# Patient Record
(demographics unavailable — no encounter records)

---

## 2025-01-06 NOTE — HISTORY OF PRESENT ILLNESS
[Friend] : friend [FreeTextEntry1] : follow-up, administrative encounter [de-identified] : 27 year old female here for a f/u visit. Currently she has no acute medical complaints. She is requesting testing for pre-employment.

## 2025-01-06 NOTE — HISTORY OF PRESENT ILLNESS
[Friend] : friend [FreeTextEntry1] : follow-up, administrative encounter [de-identified] : 27 year old female here for a f/u visit. Currently she has no acute medical complaints. She is requesting testing for pre-employment.

## 2025-01-06 NOTE — REVIEW OF SYSTEMS
[Fever] : no fever [Discharge] : no discharge [Vision Problems] : no vision problems [Earache] : no earache [Nasal Discharge] : no nasal discharge [Chest Pain] : no chest pain [Shortness Of Breath] : no shortness of breath [Abdominal Pain] : no abdominal pain [Vomiting] : no vomiting [Dysuria] : no dysuria [Hematuria] : no hematuria [Joint Pain] : no joint pain [Muscle Pain] : no muscle pain [Itching] : no itching

## 2025-02-10 NOTE — HISTORY OF PRESENT ILLNESS
[FreeTextEntry1] : She is a 27-year-old female with a long history of intermittent crampy upper abdominal pain associate with heartburn and chest pain.  She has had this since high school but has been worse recently.  She denies NSAID use.  She denies nausea vomiting or difficulty swallowing fods.She denies that stress induces her symptoms nor does any particular foods precipitate his symptoms.  She has a past history of peptic ulcer disease and had an endoscopy in 2018 at which time a gastric ulcer was found.   She also has a family history of gastric CA specifically her father.

## 2025-03-17 NOTE — HISTORY OF PRESENT ILLNESS
[FreeTextEntry1] : Dear Rafael,   I had the pleasure of seeing your patient DICK ABERNATHY for Cardiometabolic evaluation.   As you know, she  is a Pleasant, 27 year old with a past medical history of Morbid Obesity, PCOS, Pre-T2DM =============== =============== - Discussed diet and exercise at length - Start GLP1RA - Check Lpa/CRP - We discussed diet/exercise to be followed by nutritional counseling by an appointment to be made with our RD at the lipid center. - Discussed risks/benefits of GLP1RA and strategy for very gradual discontinuation of medication ----------------- -----------------  Mounj 5  Lost 20 lbs  ----------------- ----------------- 3-2025 CC: Heart Issues - Patient reports no chest pain, no localization to the sternum, no radiation to the neck/jaw, no alleviating nor worsening precipitants to CP, no assoc symptoms to CP no alleviating nor worsening precipitants to CP, no assoc symptoms to CP - Patient notes no associated SOB/Palps/Leg swelling - Reports No associated F/C/N/V/Headaches - Reports Normal Exercise Tolerance - Reports no medication changes - Reports normal mood/quality of life - Reports no associated midnight awakenings from cP - Reports no diet changes - Reports no associated body aches - Reports no recent colds/viruses - Recent labs/imaging reviewed - Relevant Family history reviewed Mother/Father - CV Risk Assessment for 10 Year ACC/AHA Pooled Risk Cohort Equation places this person at < 7.5% Risk of ASCVD Incr Mounj to 10 mg        ----------------------------

## 2025-03-17 NOTE — DISCUSSION/SUMMARY
[FreeTextEntry1] : In summary   Madi, 37 year old with a past medical history of Morbid Obesity, Hyperlipidemia, Pre-T2DM, ASCVD By Plaque On CT =============== =============== Morbid Obesity, Hyperlipidemia, Pre-T2DM, ASCVD By Plaque On CT  - Discussed diet and exercise at length - Continue GLP1RA - Check Lpa/CRP - We discussed diet/exercise to be followed by nutritional counseling by an appointment to be made with our RD at the lipid center. - Discussed risks/benefits of GLP1RA and strategy for very gradual discontinuation of medication - [facility] per Dr. Adilson Hall, kind thanks for the referral.   Dorian Gandhi MD [facility] AZRA NI Director, Preventive Cardiology & Lipidology [facility]                                                                                                                                                                                                                                                                     --                                                                                                                                                                                                                                                                                                                                                                                                                                                                                                                                                                                                                                                                                                                                   Obesity management - Assessed - Impression is active - Sleep apnea assessed - Recent labs/imaging reviewed including from PCP Cholesterol management - Assessed - Impression is active; changes as per above - Discussed diet and exercise at length - Any lifestyle/medication changes as per above BP - Assessed - Impression is active Risk factors for cardiomyopathy - Assessed - Impression is stable Cardiac Health optimization - Discussed diet and exercise at length - Discussed importance of monitoring and re-assessment of cardiac health on further visits            -----------

## 2025-03-25 NOTE — PLAN
[FreeTextEntry1] : Pre-operative evaluation - Vitals/Physical exam/EKG/labs at baseline, patient is medically optimized for proposed procedure and may proceed ahead as planned.

## 2025-03-25 NOTE — HISTORY OF PRESENT ILLNESS
[No Pertinent Cardiac History] : no history of aortic stenosis, atrial fibrillation, coronary artery disease, recent myocardial infarction, or implantable device/pacemaker [No Pertinent Pulmonary History] : no history of asthma, COPD, sleep apnea, or smoking [Smoker] : smoker [No Adverse Anesthesia Reaction] : no adverse anesthesia reaction in self or family member [Aortic Stenosis] : no aortic stenosis [Atrial Fibrillation] : no atrial fibrillation [Coronary Artery Disease] : no coronary artery disease [Recent Myocardial Infarction] : no recent myocardial infarction [Implantable Device/Pacemaker] : no implantable device/pacemaker [Asthma] : no asthma [COPD] : no COPD [Sleep Apnea] : no sleep apnea [Family Member] : no family member with adverse anesthesia reaction/sudden death [Self] : no previous adverse anesthesia reaction [Chronic Anticoagulation] : no chronic anticoagulation [Chronic Kidney Disease] : no chronic kidney disease [Diabetes] : no diabetes [FreeTextEntry1] : Liposuction [FreeTextEntry2] : 6/10/2025 [FreeTextEntry3] : Dr. Moran [FreeTextEntry4] : 27 year old female here for pre-operative evaluation. Currently she has no acute medical complaints.  [Friend] : friend

## 2025-04-16 NOTE — HISTORY OF PRESENT ILLNESS
[FreeTextEntry1] : Ms. DICK ABERNATHY  is a 28 year old female who has a past medical history significant for  Morbid Obesity, PCOS, Pre-T2DM who presents via telehealth for a follow up evaluation. Patient feels generally well today. Denies SOB, chest pain, palpitations, dizziness, and syncope.   =============== =============== - Discussed diet and exercise at length - Start GLP1RA - Check Lpa/CRP - We discussed diet/exercise to be followed by nutritional counseling by an appointment to be made with our RD at the lipid center. - Discussed risks/benefits of GLP1RA and strategy for very gradual discontinuation of medication ----------------- -----------------  Mounj 5  Lost 20 lbs  ----------------- ----------------- 3-2025 CC: Heart Issues - Patient reports no chest pain, no localization to the sternum, no radiation to the neck/jaw, no alleviating nor worsening precipitants to CP, no assoc symptoms to CP no alleviating nor worsening precipitants to CP, no assoc symptoms to CP - Patient notes no associated SOB/Palps/Leg swelling - Reports No associated F/C/N/V/Headaches - Reports Normal Exercise Tolerance - Reports no medication changes - Reports normal mood/quality of life - Reports no associated midnight awakenings from cP - Reports no diet changes - Reports no associated body aches - Reports no recent colds/viruses - Recent labs/imaging reviewed - Relevant Family history reviewed Mother/Father - CV Risk Assessment for 10 Year ACC/AHA Pooled Risk Cohort Equation places this person at < 7.5% Risk of ASCVD Incr Mounj to 10 mg    ======================= 4/2025  Pt no show on Audiovisual link called at message left at [phone] to call office to reschedule

## 2025-04-16 NOTE — HISTORY OF PRESENT ILLNESS
[FreeTextEntry1] : Ms. DICK ABERNATHY  is a 28 year old female who has a past medical history significant for  Morbid Obesity, PCOS, Pre-T2DM who presents via telehealth for a follow up evaluation. Patient feels generally well today. Denies SOB, chest pain, palpitations, dizziness, and syncope.   =============== =============== - Discussed diet and exercise at length - Start GLP1RA - Check Lpa/CRP - We discussed diet/exercise to be followed by nutritional counseling by an appointment to be made with our RD at the lipid center. - Discussed risks/benefits of GLP1RA and strategy for very gradual discontinuation of medication ----------------- -----------------  Mounj 5  Lost 20 lbs  ----------------- ----------------- 3-2025 CC: Heart Issues - Patient reports no chest pain, no localization to the sternum, no radiation to the neck/jaw, no alleviating nor worsening precipitants to CP, no assoc symptoms to CP no alleviating nor worsening precipitants to CP, no assoc symptoms to CP - Patient notes no associated SOB/Palps/Leg swelling - Reports No associated F/C/N/V/Headaches - Reports Normal Exercise Tolerance - Reports no medication changes - Reports normal mood/quality of life - Reports no associated midnight awakenings from cP - Reports no diet changes - Reports no associated body aches - Reports no recent colds/viruses - Recent labs/imaging reviewed - Relevant Family history reviewed Mother/Father - CV Risk Assessment for 10 Year ACC/AHA Pooled Risk Cohort Equation places this person at < 7.5% Risk of ASCVD Incr Mounj to 10 mg    ======================= 4/2025  Pt no show on Audiovisual link called at message left at [phone] to call office to reschedule

## 2025-04-16 NOTE — DISCUSSION/SUMMARY
[FreeTextEntry1] : Diagnosis:Morbid Obesity, Hyperlipidemia, Pre-T2DM, ASCVD By Plaque On CT   Pt no show on Audiovisual link called at message left to call office to reschedule            -----------

## 2025-06-09 NOTE — REVIEW OF SYSTEMS
[Fatigue] : fatigue [Abdominal Pain] : abdominal pain [Nausea] : nausea [Dysuria] : dysuria [Headache] : headache [Fever] : no fever [Discharge] : no discharge [Vision Problems] : no vision problems [Earache] : no earache [Chest Pain] : no chest pain [Nasal Discharge] : no nasal discharge [Orthopnea] : no orthopnea [Shortness Of Breath] : no shortness of breath [Joint Pain] : no joint pain [Muscle Pain] : no muscle pain [Itching] : no itching [Skin Rash] : no skin rash [Suicidal] : not suicidal [Memory Loss] : no memory loss [Easy Bleeding] : no easy bleeding

## 2025-06-09 NOTE — REVIEW OF SYSTEMS
[Fatigue] : fatigue [Abdominal Pain] : abdominal pain [Nausea] : nausea [Dysuria] : dysuria [Headache] : headache [Fever] : no fever [Vision Problems] : no vision problems [Discharge] : no discharge [Earache] : no earache [Nasal Discharge] : no nasal discharge [Chest Pain] : no chest pain [Orthopnea] : no orthopnea [Shortness Of Breath] : no shortness of breath [Joint Pain] : no joint pain [Muscle Pain] : no muscle pain [Itching] : no itching [Skin Rash] : no skin rash [Suicidal] : not suicidal [Memory Loss] : no memory loss [Easy Bleeding] : no easy bleeding

## 2025-06-09 NOTE — PLAN
[FreeTextEntry1] : Abdominal pain - acute on chronic, has h/o gallstones, vitals/PE benign, will check US abdomen, has appt pending w/ general surgery next week to evaluate for cholecystectomy. Will check labs. Has been to GI in past and had EGD done.   Obesity - chronic, losing weight on mounjaro 10 PCOS/Pre-DM - chronic, will check labs

## 2025-06-09 NOTE — HISTORY OF PRESENT ILLNESS
[de-identified] : 28 year old female here with complaints of abdominal pain which has been on/off for several years.  Currently the pain is mild, about 2/10.  She does have a history of gallstones and is requesting labs/imaging.  She also complains of nausea at times as well.  She denies any bowel changes such as diarrhea, blood in stool or constipation.   [FreeTextEntry1] : follow-up, abdominal pain

## 2025-06-09 NOTE — HISTORY OF PRESENT ILLNESS
[FreeTextEntry1] : follow-up, abdominal pain [de-identified] : 28 year old female here with complaints of abdominal pain which has been on/off for several years.  Currently the pain is mild, about 2/10.  She does have a history of gallstones and is requesting labs/imaging.  She also complains of nausea at times as well.  She denies any bowel changes such as diarrhea, blood in stool or constipation.

## 2025-06-13 NOTE — CONSULT LETTER
[Dear  ___] : Dear  [unfilled], [Consult Letter:] : I had the pleasure of evaluating your patient, [unfilled]. [Please see my note below.] : Please see my note below. [Consult Closing:] : Thank you very much for allowing me to participate in the care of this patient.  If you have any questions, please do not hesitate to contact me. [Sincerely,] : Sincerely, [FreeTextEntry2] : Dr. Gamaliel De Anda [FreeTextEntry3] : Jason Smith MD, FACS, FASMBS [facility] [address] [place] [address] tel. [phone] fax [phone]

## 2025-06-13 NOTE — ASSESSMENT
[FreeTextEntry1] : 28-year-old female with symptomatic cholelithiasis and cholecystitis.  Currently on antibiotics. I have recommended laparoscopic cholecystectomy.  Risks, benefits, alternatives discussed at length with patient. This included discussion of laparoscopic approach, risk of bleeding, small risk of injury to biliary ductal system, and possibility of conversion to open procedure. The patient expressed understanding and wishes to proceed.

## 2025-06-13 NOTE — PHYSICAL EXAM
[de-identified] : Well-appearing, no acute distress [de-identified] : Sclerae anicteric [de-identified] : Soft, nontender, nondistended.  Negative Shen sign.  No palpable masses.

## 2025-06-13 NOTE — PLAN
[FreeTextEntry1] : Laparoscopic cholecystectomy to be scheduled Complete course of antibiotics Advised to hold Mounjaro for the time being.  She will restart it postoperatively.

## 2025-06-13 NOTE — HISTORY OF PRESENT ILLNESS
[de-identified] : DICK  is a 28 year  female  here for surgical consultation for symptomatic gallstones.  Patient presents with chronic history of right upper quadrant pain radiating to right shoulder and mid back.  She states her pain has been intermittent over the last several years however more recently this year her pain has increased in both frequency and intensity.  She says the pain occurs at least once a week now, usually precipitated by a meal.  She reports no fevers or chills.  Normal bowel movements.  Patient had a series of tests done including CT scan, endoscopy and ultrasound.  CT scan and ultrasound demonstrate cholelithiasis with gallbladder wall thickening and questionable cholecystitis.  Patient is currently on ciprofloxacin.  Past medical history is notable for PCOS, prediabetes, obesity (has lost approximately 100 pounds on Mounjaro; last dose was Monday) Past surgical history: Reconstructive plastic surgery of arms, knee surgery, D&C

## 2025-06-13 NOTE — PHYSICAL EXAM
[de-identified] : Well-appearing, no acute distress [de-identified] : Sclerae anicteric [de-identified] : Soft, nontender, nondistended.  Negative Shen sign.  No palpable masses.

## 2025-06-13 NOTE — HISTORY OF PRESENT ILLNESS
[de-identified] : DICK  is a 28 year  female  here for surgical consultation for symptomatic gallstones.  Patient presents with chronic history of right upper quadrant pain radiating to right shoulder and mid back.  She states her pain has been intermittent over the last several years however more recently this year her pain has increased in both frequency and intensity.  She says the pain occurs at least once a week now, usually precipitated by a meal.  She reports no fevers or chills.  Normal bowel movements.  Patient had a series of tests done including CT scan, endoscopy and ultrasound.  CT scan and ultrasound demonstrate cholelithiasis with gallbladder wall thickening and questionable cholecystitis.  Patient is currently on ciprofloxacin.  Past medical history is notable for PCOS, prediabetes, obesity (has lost approximately 100 pounds on Mounjaro; last dose was Monday) Past surgical history: Reconstructive plastic surgery of arms, knee surgery, D&C

## 2025-06-13 NOTE — CONSULT LETTER
[Dear  ___] : Dear  [unfilled], [Consult Letter:] : I had the pleasure of evaluating your patient, [unfilled]. [Please see my note below.] : Please see my note below. [Consult Closing:] : Thank you very much for allowing me to participate in the care of this patient.  If you have any questions, please do not hesitate to contact me. [Sincerely,] : Sincerely, [FreeTextEntry2] : Dr. Gamaliel De Anda [FreeTextEntry3] : Jason Smith MD, FACS, FASMBS [facility] [address] [place] [address] tel. [phone] fax [phone]

## 2025-07-14 NOTE — PLAN
[FreeTextEntry1] : Advised to follow a low-fat diet over the next several weeks No heavy lifting or abdominal exercises for 2 more weeks Follow-up as needed

## 2025-07-14 NOTE — HISTORY OF PRESENT ILLNESS
[de-identified] : DICK is a 28 year old female, s/p Robot-assisted laparoscopic cholecystectomy, ICG cholangiogram, and bilateral TAP block 06/25/25.  Overall she says she feels well.  Patient is tolerating a normal diet without any abdominal pain, nausea or vomiting.  She reports no fevers.  Having normal bowel movements.

## 2025-07-14 NOTE — ASSESSMENT
[FreeTextEntry1] : 28-year-old female recovering well status post robot-assisted laparoscopic cholecystectomy for treatment of symptomatic gallstones. Pathology results reviewed with patient --benign

## 2025-07-14 NOTE — HISTORY OF PRESENT ILLNESS
[de-identified] : DICK is a 28 year old female, s/p Robot-assisted laparoscopic cholecystectomy, ICG cholangiogram, and bilateral TAP block 06/25/25.  Overall she says she feels well.  Patient is tolerating a normal diet without any abdominal pain, nausea or vomiting.  She reports no fevers.  Having normal bowel movements.

## 2025-07-14 NOTE — PHYSICAL EXAM
[de-identified] : Well-appearing, no acute distress [de-identified] : Sclerae anicteric [de-identified] : Soft, nontender, nondistended.  Incisions well-healed without erythema or drainage

## 2025-07-14 NOTE — PHYSICAL EXAM
[de-identified] : Well-appearing, no acute distress [de-identified] : Sclerae anicteric [de-identified] : Soft, nontender, nondistended.  Incisions well-healed without erythema or drainage

## 2025-07-29 NOTE — DISCUSSION/SUMMARY
[FreeTextEntry1] : In summary   Madi, 37 year old with a past medical history of Morbid Obesity, Hyperlipidemia, Pre-T2DM, ASCVD By Plaque On CT =============== =============== Morbid Obesity, Hyperlipidemia, Pre-T2DM, ASCVD By Plaque On CT  - Discussed diet and exercise at length - Continue GLP1RA - Check Lpa/CRP - We discussed diet/exercise to be followed by nutritional counseling by an appointment to be made with our RD at the lipid center. - Discussed risks/benefits of GLP1RA and strategy for very gradual discontinuation of medication - [facility] per Dr. Adilson Hall, kind thanks for the referral.   Dorian Gandhi MD [facility] AZRA NI Director, Preventive Cardiology & Lipidology [facility]                                                                                                                                                                                                                                                                     --                                                                                                                                                                                                                                                                                                                                                                                                                                                                                                                                                                                                                                                                                                                                   Obesity management - Assessed - Impression is active - Sleep apnea assessed - Recent labs/imaging reviewed including from PCP Cholesterol management - Assessed - Impression is active; changes as per above - Discussed diet and exercise at length - Any lifestyle/medication changes as per above BP - Assessed - Impression is active Risk factors for cardiomyopathy - Assessed - Impression is stable Cardiac Health optimization - Discussed diet and exercise at length - Discussed importance of monitoring and re-assessment of cardiac health on further visits            -----------

## 2025-07-29 NOTE — HISTORY OF PRESENT ILLNESS
[FreeTextEntry1] : Dear Rafael,   I had the pleasure of seeing your patient DICK ABERNATHY for Cardiometabolic evaluation.   As you know, she  is a Pleasant, 27 year old with a past medical history of Morbid Obesity, PCOS, Pre-T2DM =============== =============== - Discussed diet and exercise at length - Start GLP1RA - Check Lpa/CRP - We discussed diet/exercise to be followed by nutritional counseling by an appointment to be made with our RD at the lipid center. - Discussed risks/benefits of GLP1RA and strategy for very gradual discontinuation of medication ----------------- -----------------  Mounj 5  Lost 20 lbs  ---------------------------------------------------------------------------- ---------------------------------------------------------------------------- 7-2025 CC: Heart Issues - Patient reports no chest pain, no localization to the sternum, no radiation to the neck/jaw, no alleviating nor worsening precipitants to CP, no assoc symptoms to CP - Patient notes no associated SOB/Palps/Leg swelling - Reports No associated F/C/N/V/Headaches - Reports Normal Exercise Tolerance - Reports no medication changes - Reports normal mood/quality of life - Reports no associated midnight awakenings from cP - Reports no diet changes - Reports no associated body aches- Reports no recent colds/viruses- Recent labs/imaging reviewed- Relevant Family history reviewed Mother/Father - CVRisk Assessment for 10 Year ACC/AHA Pooled Risk Cohort Equation places this person at < 7.5% Risk of ASCVD Incr Mounj to 10 mg        ----------------------------

## 2025-07-29 NOTE — DISCUSSION/SUMMARY
Subjective:       Patient ID: Alejandro Mancia is a 26 y.o. male.    Chief Complaint: Anxiety (Patient stated that he previously was on Lexapro and Zoloft for anxiety and depression. His PCP at [facility] was giving it to him but his insurance changed. He would like to see if he can get back on something. He stated that his wife takes Wellbutrin and would like to see if that helps him. )      Patient presents to clinic today for followup of chronic conditions: HTN        Review of Systems   Constitutional:  Negative for chills, fatigue, fever and unexpected weight change.   Eyes:  Negative for visual disturbance.   Respiratory:  Negative for shortness of breath.    Cardiovascular:  Negative for chest pain.   Musculoskeletal:  Negative for myalgias.   Neurological:  Negative for headaches.   Psychiatric/Behavioral:  Positive for dysphoric mood. Negative for self-injury, sleep disturbance and suicidal ideas. The patient is nervous/anxious.        Objective:      Physical Exam  Vitals and nursing note reviewed.   Constitutional:       General: He is not in acute distress.     Appearance: He is well-developed.   HENT:      Head: Normocephalic and atraumatic.   Eyes:      General: Lids are normal. No scleral icterus.     Extraocular Movements: Extraocular movements intact.      Conjunctiva/sclera: Conjunctivae normal.   Pulmonary:      Effort: Pulmonary effort is normal.   Neurological:      Mental Status: He is alert.      Cranial Nerves: No cranial nerve deficit.   Psychiatric:         Mood and Affect: Mood and affect normal.         Assessment:       1. Anxiety and depression    2. Essential hypertension        Plan:   1. Anxiety and depression  Assessment & Plan:  Pt requesting to start Wellbutrin, reviewed s/e, schedule 1 month f/u    Orders:  -     buPROPion (WELLBUTRIN XL) 150 MG TB24 tablet; Take 1 tablet (150 mg total) by mouth once daily.  Dispense: 30 tablet; Refill: 11    2. Essential hypertension  -     losartan  [FreeTextEntry1] : In summary   Madi, 37 year old with a past medical history of Morbid Obesity, Hyperlipidemia, Pre-T2DM, ASCVD By Plaque On CT =============== =============== Morbid Obesity, Hyperlipidemia, Pre-T2DM, ASCVD By Plaque On CT  - Discussed diet and exercise at length - Continue GLP1RA - Check Lpa/CRP - We discussed diet/exercise to be followed by nutritional counseling by an appointment to be made with our RD at the lipid center. - Discussed risks/benefits of GLP1RA and strategy for very gradual discontinuation of medication - [facility] per Dr. Adilson Hall, kind thanks for the referral.   Dorian Gandhi MD [facility] NI Director, Preventive Cardiology & Lipidology [facility]                                                                                                                                                                                                                                                                     --                                                                                                                                                                                                                                                                                                                                                                                                                                                                                                                                                                                                                                                                                                                                   Obesity management - Assessed - Impression is active - Sleep apnea assessed - Recent labs/imaging reviewed including from PCP Cholesterol management - Assessed - Impression is active; changes as per above - Discussed diet and exercise at length - Any lifestyle/medication changes as per above BP - Assessed - Impression is active Risk factors for cardiomyopathy - Assessed - Impression is stable Cardiac Health optimization - Discussed diet and exercise at length - Discussed importance of monitoring and re-assessment of cardiac health on further visits            -----------  (COZAAR) 25 MG tablet; Take 1 tablet (25 mg total) by mouth once daily.  Dispense: 90 tablet; Refill: 3          Health Maintenance reviewed/updated.